# Patient Record
Sex: FEMALE | Race: BLACK OR AFRICAN AMERICAN | NOT HISPANIC OR LATINO | Employment: FULL TIME | URBAN - METROPOLITAN AREA
[De-identification: names, ages, dates, MRNs, and addresses within clinical notes are randomized per-mention and may not be internally consistent; named-entity substitution may affect disease eponyms.]

---

## 2023-03-27 LAB
POC CREATININE: 0.8 MG/DL (ref 0.6–1.3)
POCT GFR - DATA CONVERSION: >60

## 2023-08-21 LAB
ALBUMIN (G/DL) IN SER/PLAS: 4 G/DL (ref 3.4–5)
ANION GAP IN SER/PLAS: 15 MMOL/L (ref 10–20)
CALCIDIOL (25 OH VITAMIN D3) (NG/ML) IN SER/PLAS: 70 NG/ML
CALCIUM (MG/DL) IN SER/PLAS: 10.5 MG/DL (ref 8.6–10.6)
CARBON DIOXIDE, TOTAL (MMOL/L) IN SER/PLAS: 27 MMOL/L (ref 21–32)
CHLORIDE (MMOL/L) IN SER/PLAS: 104 MMOL/L (ref 98–107)
CHOLESTEROL (MG/DL) IN SER/PLAS: 199 MG/DL (ref 0–199)
CHOLESTEROL IN HDL (MG/DL) IN SER/PLAS: 50.3 MG/DL
CHOLESTEROL/HDL RATIO: 4
CREATININE (MG/DL) IN SER/PLAS: 1.07 MG/DL (ref 0.5–1.05)
ESTIMATED AVERAGE GLUCOSE FOR HBA1C: 108 MG/DL
GFR FEMALE: 58 ML/MIN/1.73M2
GLUCOSE (MG/DL) IN SER/PLAS: 82 MG/DL (ref 74–99)
HEMOGLOBIN A1C/HEMOGLOBIN TOTAL IN BLOOD: 5.4 %
LDL: 89 MG/DL (ref 0–99)
NON HDL CHOLESTEROL: 149 MG/DL
PHOSPHATE (MG/DL) IN SER/PLAS: 4.1 MG/DL (ref 2.5–4.9)
POTASSIUM (MMOL/L) IN SER/PLAS: 4.7 MMOL/L (ref 3.5–5.3)
SODIUM (MMOL/L) IN SER/PLAS: 141 MMOL/L (ref 136–145)
TRIGLYCERIDE (MG/DL) IN SER/PLAS: 298 MG/DL (ref 0–149)
UREA NITROGEN (MG/DL) IN SER/PLAS: 13 MG/DL (ref 6–23)
VLDL: 60 MG/DL (ref 0–40)

## 2023-11-10 DIAGNOSIS — I10 ESSENTIAL (PRIMARY) HYPERTENSION: ICD-10-CM

## 2023-11-10 RX ORDER — AMLODIPINE BESYLATE 10 MG/1
10 TABLET ORAL DAILY
Qty: 90 TABLET | Refills: 1 | Status: SHIPPED | OUTPATIENT
Start: 2023-11-10 | End: 2024-05-13

## 2023-11-10 RX ORDER — LOSARTAN POTASSIUM 50 MG/1
50 TABLET ORAL DAILY
Qty: 90 TABLET | Refills: 1 | Status: SHIPPED | OUTPATIENT
Start: 2023-11-10 | End: 2024-05-13

## 2024-05-12 DIAGNOSIS — I10 ESSENTIAL (PRIMARY) HYPERTENSION: ICD-10-CM

## 2024-05-13 RX ORDER — AMLODIPINE BESYLATE 10 MG/1
10 TABLET ORAL DAILY
Qty: 90 TABLET | Refills: 0 | Status: SHIPPED | OUTPATIENT
Start: 2024-05-13

## 2024-05-13 RX ORDER — LOSARTAN POTASSIUM 50 MG/1
50 TABLET ORAL DAILY
Qty: 90 TABLET | Refills: 0 | Status: SHIPPED | OUTPATIENT
Start: 2024-05-13

## 2024-06-26 ENCOUNTER — APPOINTMENT (OUTPATIENT)
Dept: CARDIOLOGY | Facility: HOSPITAL | Age: 64
End: 2024-06-26
Payer: COMMERCIAL

## 2024-06-26 ENCOUNTER — HOSPITAL ENCOUNTER (EMERGENCY)
Facility: HOSPITAL | Age: 64
Discharge: HOME | End: 2024-06-26
Attending: STUDENT IN AN ORGANIZED HEALTH CARE EDUCATION/TRAINING PROGRAM
Payer: COMMERCIAL

## 2024-06-26 VITALS
TEMPERATURE: 97.6 F | RESPIRATION RATE: 18 BRPM | HEIGHT: 61 IN | OXYGEN SATURATION: 100 % | HEART RATE: 82 BPM | BODY MASS INDEX: 26.06 KG/M2 | WEIGHT: 138 LBS | DIASTOLIC BLOOD PRESSURE: 86 MMHG | SYSTOLIC BLOOD PRESSURE: 134 MMHG

## 2024-06-26 DIAGNOSIS — R55 SYNCOPE AND COLLAPSE: Primary | ICD-10-CM

## 2024-06-26 LAB
ALBUMIN SERPL BCP-MCNC: 4.2 G/DL (ref 3.4–5)
ALP SERPL-CCNC: 132 U/L (ref 33–136)
ALT SERPL W P-5'-P-CCNC: 44 U/L (ref 7–45)
ANION GAP SERPL CALC-SCNC: 17 MMOL/L (ref 10–20)
AST SERPL W P-5'-P-CCNC: 46 U/L (ref 9–39)
BASOPHILS # BLD AUTO: 0.02 X10*3/UL (ref 0–0.1)
BASOPHILS NFR BLD AUTO: 0.5 %
BILIRUB SERPL-MCNC: 0.5 MG/DL (ref 0–1.2)
BUN SERPL-MCNC: 9 MG/DL (ref 6–23)
CALCIUM SERPL-MCNC: 10.7 MG/DL (ref 8.6–10.3)
CARDIAC TROPONIN I PNL SERPL HS: 4 NG/L (ref 0–13)
CARDIAC TROPONIN I PNL SERPL HS: 5 NG/L (ref 0–13)
CHLORIDE SERPL-SCNC: 103 MMOL/L (ref 98–107)
CO2 SERPL-SCNC: 24 MMOL/L (ref 21–32)
CREAT SERPL-MCNC: 1.19 MG/DL (ref 0.5–1.05)
EGFRCR SERPLBLD CKD-EPI 2021: 51 ML/MIN/1.73M*2
EOSINOPHIL # BLD AUTO: 0.03 X10*3/UL (ref 0–0.7)
EOSINOPHIL NFR BLD AUTO: 0.8 %
ERYTHROCYTE [DISTWIDTH] IN BLOOD BY AUTOMATED COUNT: 14 % (ref 11.5–14.5)
GLUCOSE SERPL-MCNC: 98 MG/DL (ref 74–99)
HCT VFR BLD AUTO: 42.5 % (ref 36–46)
HGB BLD-MCNC: 14.1 G/DL (ref 12–16)
IMM GRANULOCYTES # BLD AUTO: 0 X10*3/UL (ref 0–0.7)
IMM GRANULOCYTES NFR BLD AUTO: 0 % (ref 0–0.9)
LYMPHOCYTES # BLD AUTO: 1.15 X10*3/UL (ref 1.2–4.8)
LYMPHOCYTES NFR BLD AUTO: 31.4 %
MAGNESIUM SERPL-MCNC: 2.3 MG/DL (ref 1.6–2.4)
MCH RBC QN AUTO: 31.6 PG (ref 26–34)
MCHC RBC AUTO-ENTMCNC: 33.2 G/DL (ref 32–36)
MCV RBC AUTO: 95 FL (ref 80–100)
MONOCYTES # BLD AUTO: 0.19 X10*3/UL (ref 0.1–1)
MONOCYTES NFR BLD AUTO: 5.2 %
NEUTROPHILS # BLD AUTO: 2.27 X10*3/UL (ref 1.2–7.7)
NEUTROPHILS NFR BLD AUTO: 62.1 %
NRBC BLD-RTO: 0.5 /100 WBCS (ref 0–0)
PLATELET # BLD AUTO: 329 X10*3/UL (ref 150–450)
POTASSIUM SERPL-SCNC: 3.5 MMOL/L (ref 3.5–5.3)
PROT SERPL-MCNC: 7.5 G/DL (ref 6.4–8.2)
RBC # BLD AUTO: 4.46 X10*6/UL (ref 4–5.2)
SODIUM SERPL-SCNC: 140 MMOL/L (ref 136–145)
WBC # BLD AUTO: 3.7 X10*3/UL (ref 4.4–11.3)

## 2024-06-26 PROCEDURE — 99283 EMERGENCY DEPT VISIT LOW MDM: CPT

## 2024-06-26 PROCEDURE — 84484 ASSAY OF TROPONIN QUANT: CPT | Performed by: STUDENT IN AN ORGANIZED HEALTH CARE EDUCATION/TRAINING PROGRAM

## 2024-06-26 PROCEDURE — 80053 COMPREHEN METABOLIC PANEL: CPT | Performed by: STUDENT IN AN ORGANIZED HEALTH CARE EDUCATION/TRAINING PROGRAM

## 2024-06-26 PROCEDURE — 36415 COLL VENOUS BLD VENIPUNCTURE: CPT | Performed by: STUDENT IN AN ORGANIZED HEALTH CARE EDUCATION/TRAINING PROGRAM

## 2024-06-26 PROCEDURE — 85025 COMPLETE CBC W/AUTO DIFF WBC: CPT | Performed by: STUDENT IN AN ORGANIZED HEALTH CARE EDUCATION/TRAINING PROGRAM

## 2024-06-26 PROCEDURE — 93005 ELECTROCARDIOGRAM TRACING: CPT

## 2024-06-26 PROCEDURE — 2500000004 HC RX 250 GENERAL PHARMACY W/ HCPCS (ALT 636 FOR OP/ED): Performed by: STUDENT IN AN ORGANIZED HEALTH CARE EDUCATION/TRAINING PROGRAM

## 2024-06-26 PROCEDURE — 83735 ASSAY OF MAGNESIUM: CPT | Performed by: STUDENT IN AN ORGANIZED HEALTH CARE EDUCATION/TRAINING PROGRAM

## 2024-06-26 ASSESSMENT — COLUMBIA-SUICIDE SEVERITY RATING SCALE - C-SSRS
2. HAVE YOU ACTUALLY HAD ANY THOUGHTS OF KILLING YOURSELF?: NO
1. IN THE PAST MONTH, HAVE YOU WISHED YOU WERE DEAD OR WISHED YOU COULD GO TO SLEEP AND NOT WAKE UP?: NO
6. HAVE YOU EVER DONE ANYTHING, STARTED TO DO ANYTHING, OR PREPARED TO DO ANYTHING TO END YOUR LIFE?: NO

## 2024-06-26 ASSESSMENT — PAIN - FUNCTIONAL ASSESSMENT: PAIN_FUNCTIONAL_ASSESSMENT: 0-10

## 2024-06-26 ASSESSMENT — PAIN SCALES - GENERAL
PAINLEVEL_OUTOF10: 0 - NO PAIN
PAINLEVEL_OUTOF10: 0 - NO PAIN

## 2024-06-26 NOTE — DISCHARGE INSTRUCTIONS
You have been seen at a [facility].  Please follow-up with your primary care provider in the next 1 to 2 days for further evaluation and routine follow-up.  Please return to the emergency room if having any worsening symptoms.  Please follow-up with any specialists if discussed during your emergency room stay.

## 2024-06-26 NOTE — ED TRIAGE NOTES
Pt. Presents to the ED from being at a eye doctor appointment with her father when she began to feel light headed. She had a near syncope episode. The clinic called EMS. Upon EMS arrival pt. Was hypotensive 80/60. Pt. Received fluids. Pt. Alert and oriented x4. No complaints at this time.

## 2024-06-26 NOTE — ED PROVIDER NOTES
EMERGENCY MEDICINE EVALUATION NOTE    History of Present Illness     Chief Complaint:   Chief Complaint   Patient presents with    Dizziness       HPI: Ross Chew is a 64 y.o. female with past medical history of CAD and hypertension who presents with complaint of syncope.  Patient is prior to arrival she was at work and was sitting down at her desk when she started to feel severely lightheaded and had an episode of syncope.  She denies any fall or head trauma.  He states she lost consciousness for several seconds and then came to.  She denies any preceding or current chest pain.  Denies any shortness of breath, fever, chills, anticoagulant usage.  She does admit a similar episode years ago.  She states she feels she is back to her baseline and has no current complaints.    Previous History   No past medical history on file.  Past Surgical History:   Procedure Laterality Date    CT ABDOMEN PELVIS ANGIOGRAM W AND/OR WO IV CONTRAST  1/12/2020    CT ABDOMEN PELVIS ANGIOGRAM W AND/OR WO IV CONTRAST 1/12/2020 [facility] CLINICAL LEGACY    CT ABDOMEN PELVIS ANGIOGRAM W AND/OR WO IV CONTRAST  8/25/2020    CT ABDOMEN PELVIS ANGIOGRAM W AND/OR WO IV CONTRAST [place] [facility] [place]        No family history on file.  No Known Allergies  Current Outpatient Medications   Medication Instructions    amLODIPine (NORVASC) 10 mg, oral, Daily, for blood pressure    buPROPion (Zyban) 150 mg 12 hr tablet TAKE 1 TABLET ONCE A DAY FOR 2 DAYS THEN TAKE 1 TABLET TWICE A DAY THEREAFTER.    losartan (COZAAR) 50 mg, oral, Daily       Physical Exam     Appearance: Alert, oriented , cooperative,  in acute distress. Well nourished & well hydrated.     Skin: Intact,  dry skin, no lesions, rash, petechiae or purpura.      Eyes: PERRLA, EOMs intact,  Conjunctiva pink with no redness or exudates. Cornea & anterior chamber are clear, Eyelids without lesions. No scleral icterus.      ENT: Hearing grossly intact. External auditory canals patent,  tympanic membranes intact with visible landmarks. Nares patent, mucus membranes moist. Dentition without lesions. Pharynx clear, uvula midline.      Neck: Supple, without meningismus. Thyroid not palpable. Trachea at midline. No lymphadenopathy.     Pulmonary: Clear bilaterally with good chest wall excursion. No rales, rhonchi or wheezing. No accessory muscle use or stridor.     Cardiac: Normal S1, S2 without murmur, rub, gallop or extrasystole. No JVD, Carotids without bruits.     Abdomen: Soft, nontender, active bowel sounds.  No palpable organomegaly.  No rebound or guarding.  No CVA tenderness.     Genitourinary: Exam deferred.     Musculoskeletal: Full range of motion. no pain, edema, or deformity. Pulses full and equal. No cyanosis or clubbing.      Neurological:  Cranial nerves II through XII are grossly intact, finger-nose touch is normal, normal sensation, no weakness, no focal findings identified.     Psychiatric: Appropriate mood and affect.      Results     Labs Reviewed   CBC WITH AUTO DIFFERENTIAL - Abnormal       Result Value    WBC 3.7 (*)     nRBC 0.5 (*)     RBC 4.46      Hemoglobin 14.1      Hematocrit 42.5      MCV 95      MCH 31.6      MCHC 33.2      RDW 14.0      Platelets 329      Neutrophils % 62.1      Immature Granulocytes %, Automated 0.0      Lymphocytes % 31.4      Monocytes % 5.2      Eosinophils % 0.8      Basophils % 0.5      Neutrophils Absolute 2.27      Immature Granulocytes Absolute, Automated 0.00      Lymphocytes Absolute 1.15 (*)     Monocytes Absolute 0.19      Eosinophils Absolute 0.03      Basophils Absolute 0.02     COMPREHENSIVE METABOLIC PANEL - Abnormal    Glucose 98      Sodium 140      Potassium 3.5      Chloride 103      Bicarbonate 24      Anion Gap 17      Urea Nitrogen 9      Creatinine 1.19 (*)     eGFR 51 (*)     Calcium 10.7 (*)     Albumin 4.2      Alkaline Phosphatase 132      Total Protein 7.5      AST 46 (*)     Bilirubin, Total 0.5      ALT 44     MAGNESIUM  - Normal    Magnesium 2.30     SERIAL TROPONIN-INITIAL - Normal    Troponin I, High Sensitivity 5      Narrative:     Less than 99th percentile of normal range cutoff-  Female and children under 18 years old <14 ng/L; Male <21 ng/L: Negative  Repeat testing should be performed if clinically indicated.     Female and children under 18 years old 14-50 ng/L; Male 21-50 ng/L:  Consistent with possible cardiac damage and possible increased clinical   risk. Serial measurements may help to assess extent of myocardial damage.     >50 ng/L: Consistent with cardiac damage, increased clinical risk and  myocardial infarction. Serial measurements may help assess extent of   myocardial damage.      NOTE: Children less than 1 year old may have higher baseline troponin   levels and results should be interpreted in conjunction with the overall   clinical context.     NOTE: Troponin I testing is performed using a different   testing methodology at [facility] than at other   [facility]. Direct result comparisons should only   be made within the same method.   SERIAL TROPONIN, 1 HOUR - Normal    Troponin I, High Sensitivity 4      Narrative:     Less than 99th percentile of normal range cutoff-  Female and children under 18 years old <14 ng/L; Male <21 ng/L: Negative  Repeat testing should be performed if clinically indicated.     Female and children under 18 years old 14-50 ng/L; Male 21-50 ng/L:  Consistent with possible cardiac damage and possible increased clinical   risk. Serial measurements may help to assess extent of myocardial damage.     >50 ng/L: Consistent with cardiac damage, increased clinical risk and  myocardial infarction. Serial measurements may help assess extent of   myocardial damage.      NOTE: Children less than 1 year old may have higher baseline troponin   levels and results should be interpreted in conjunction with the overall   clinical context.     NOTE: Troponin I testing is performed  "using a different   testing methodology at [facility] than at other   [facility]. Direct result comparisons should only   be made within the same method.   TROPONIN SERIES- (INITIAL, 1 HR)    Narrative:     The following orders were created for panel order Troponin I Series, High Sensitivity (0, 1 HR).  Procedure                               Abnormality         Status                     ---------                               -----------         ------                     Troponin I, High Sensiti...[[id number]]  Normal              Final result               Troponin, High Sensitivi...[[id number]]  Normal              Final result                 Please view results for these tests on the individual orders.     No orders to display         ED Course & Medical Decision Making     Medications   sodium chloride 0.9 % bolus 1,000 mL (0 mL intravenous Stopped 6/26/24 1231)     Diagnoses as of 06/26/24 1420   Syncope and collapse     Heart Rate:  [62-81]   Temperature:  [36.4 °C (97.6 °F)]   Respirations:  [13-30]   BP: (114-133)/(73-81)   Height:  [154.9 cm (5' 1\")]   Weight:  [62.6 kg (138 lb)]   Pulse Ox:  [97 %-100 %]      Ross Chew is a 64 y.o. female with past medical history of CAD and hypertension who presents with complaint of syncope. Patient was nontoxic, stable. Ambulatory. Exam as above.  EKG reviewed and did show normal sinus rhythm with a rate of 64 bpm no significant signs of new acute ischemic change or arrhythmia  Labs reviewed.  No significant anemia, electrolyte abnormality.  Mildly elevated creatinine 1.19 which is appear to be somewhat near baseline most likely chronic.  Nonspecific leukopenia noted.  Reviewed external records.   Differential diagnosis considered. Overall presentation is consistent with syncope. Low suspicion for cardiac dysrhythmia, stroke, intracranial bleed, pulmonary embolus, or other serious cardiac or neurogenic cause of syncope.  Patient was treated with " 1 L normal saline with improvement in symptoms.  Consideration was given for admission, but the patient was stable for outpatient management.  She was provided a cardiology referral for additional evaluation and was agreeable with this plan.      Procedures   Procedures    Diagnosis     1. Syncope and collapse        Disposition     DISCHARGE.  The patient is discharged back to their place of residence.  Discharge diagnosis, instructions and plan were discussed and understood. At the time of discharge the patient was comfortable and was in no apparent distress. Patient is aware of diagnostic uncertainty and was notified though testing is negative here, there is a very small chance that pathology may be missed.  The patient understands these risks and the patient /family understood to return immediately to the emergency department if the symptoms worsen or if they have any additional concerns.    FOLLOW UP  Primary care provider in 1-2 days.        ED Prescriptions    None            Dharmesh Toledo DO  06/26/24 1421

## 2024-06-29 LAB
ATRIAL RATE: 64 BPM
P AXIS: 29 DEGREES
P OFFSET: 180 MS
P ONSET: 131 MS
PR INTERVAL: 180 MS
Q ONSET: 221 MS
QRS COUNT: 11 BEATS
QRS DURATION: 72 MS
QT INTERVAL: 426 MS
QTC CALCULATION(BAZETT): 439 MS
QTC FREDERICIA: 435 MS
R AXIS: 12 DEGREES
T AXIS: 36 DEGREES
T OFFSET: 434 MS
VENTRICULAR RATE: 64 BPM

## 2024-08-12 DIAGNOSIS — I10 ESSENTIAL (PRIMARY) HYPERTENSION: ICD-10-CM

## 2024-08-12 RX ORDER — LOSARTAN POTASSIUM 50 MG/1
50 TABLET ORAL DAILY
Qty: 90 TABLET | Refills: 0 | Status: SHIPPED | OUTPATIENT
Start: 2024-08-12

## 2024-08-12 RX ORDER — AMLODIPINE BESYLATE 10 MG/1
10 TABLET ORAL DAILY
Qty: 90 TABLET | Refills: 0 | Status: SHIPPED | OUTPATIENT
Start: 2024-08-12

## 2024-12-10 ENCOUNTER — APPOINTMENT (OUTPATIENT)
Dept: PRIMARY CARE | Facility: HOSPITAL | Age: 64
End: 2024-12-10
Payer: COMMERCIAL

## 2024-12-11 ENCOUNTER — OFFICE VISIT (OUTPATIENT)
Dept: PRIMARY CARE | Facility: HOSPITAL | Age: 64
End: 2024-12-11
Payer: COMMERCIAL

## 2024-12-11 VITALS
TEMPERATURE: 96.1 F | HEART RATE: 62 BPM | WEIGHT: 139 LBS | HEIGHT: 61 IN | SYSTOLIC BLOOD PRESSURE: 127 MMHG | DIASTOLIC BLOOD PRESSURE: 61 MMHG | OXYGEN SATURATION: 100 % | BODY MASS INDEX: 26.24 KG/M2

## 2024-12-11 DIAGNOSIS — Z12.31 SCREENING MAMMOGRAM FOR BREAST CANCER: ICD-10-CM

## 2024-12-11 DIAGNOSIS — F17.200 SMOKING: ICD-10-CM

## 2024-12-11 DIAGNOSIS — I71.00 INTRAMURAL AORTIC HEMATOMA (MULTI): ICD-10-CM

## 2024-12-11 DIAGNOSIS — E78.5 HYPERLIPIDEMIA, UNSPECIFIED HYPERLIPIDEMIA TYPE: Primary | ICD-10-CM

## 2024-12-11 LAB
ALBUMIN SERPL BCP-MCNC: 4.4 G/DL (ref 3.4–5)
ALP SERPL-CCNC: 100 U/L (ref 33–136)
ALT SERPL W P-5'-P-CCNC: 30 U/L (ref 7–45)
ANION GAP SERPL CALC-SCNC: 16 MMOL/L (ref 10–20)
AST SERPL W P-5'-P-CCNC: 49 U/L (ref 9–39)
BILIRUB SERPL-MCNC: 0.6 MG/DL (ref 0–1.2)
BUN SERPL-MCNC: 14 MG/DL (ref 6–23)
CALCIUM SERPL-MCNC: 10.1 MG/DL (ref 8.6–10.6)
CHLORIDE SERPL-SCNC: 104 MMOL/L (ref 98–107)
CHOLEST SERPL-MCNC: 217 MG/DL (ref 0–199)
CHOLESTEROL/HDL RATIO: 4
CO2 SERPL-SCNC: 24 MMOL/L (ref 21–32)
CREAT SERPL-MCNC: 0.7 MG/DL (ref 0.5–1.05)
EGFRCR SERPLBLD CKD-EPI 2021: >90 ML/MIN/1.73M*2
EST. AVERAGE GLUCOSE BLD GHB EST-MCNC: 111 MG/DL
GLUCOSE SERPL-MCNC: 114 MG/DL (ref 74–99)
HBA1C MFR BLD: 5.5 %
HDLC SERPL-MCNC: 53.8 MG/DL
NON-HDL CHOLESTEROL: 163 MG/DL (ref 0–149)
POTASSIUM SERPL-SCNC: 3.8 MMOL/L (ref 3.5–5.3)
PROT SERPL-MCNC: 7 G/DL (ref 6.4–8.2)
SODIUM SERPL-SCNC: 140 MMOL/L (ref 136–145)

## 2024-12-11 PROCEDURE — 99215 OFFICE O/P EST HI 40 MIN: CPT | Performed by: INTERNAL MEDICINE

## 2024-12-11 PROCEDURE — 80053 COMPREHEN METABOLIC PANEL: CPT

## 2024-12-11 PROCEDURE — 99000 SPECIMEN HANDLING OFFICE-LAB: CPT | Performed by: INTERNAL MEDICINE

## 2024-12-11 PROCEDURE — 83718 ASSAY OF LIPOPROTEIN: CPT

## 2024-12-11 PROCEDURE — 99215 OFFICE O/P EST HI 40 MIN: CPT | Mod: GC

## 2024-12-11 PROCEDURE — 36415 COLL VENOUS BLD VENIPUNCTURE: CPT

## 2024-12-11 PROCEDURE — 83036 HEMOGLOBIN GLYCOSYLATED A1C: CPT

## 2024-12-11 PROCEDURE — 99215 OFFICE O/P EST HI 40 MIN: CPT | Mod: GC,25 | Performed by: INTERNAL MEDICINE

## 2024-12-11 PROCEDURE — 3008F BODY MASS INDEX DOCD: CPT | Performed by: INTERNAL MEDICINE

## 2024-12-11 RX ORDER — BUPROPION HYDROCHLORIDE 150 MG/1
150 TABLET, FILM COATED, EXTENDED RELEASE ORAL 2 TIMES DAILY
Qty: 60 TABLET | Refills: 2 | Status: SHIPPED | OUTPATIENT
Start: 2024-12-11 | End: 2025-03-11

## 2024-12-11 RX ORDER — ATORVASTATIN CALCIUM 40 MG/1
40 TABLET, FILM COATED ORAL DAILY
Qty: 30 TABLET | Refills: 2 | Status: SHIPPED | OUTPATIENT
Start: 2024-12-11 | End: 2025-03-11

## 2024-12-11 ASSESSMENT — PATIENT HEALTH QUESTIONNAIRE - PHQ9
SUM OF ALL RESPONSES TO PHQ9 QUESTIONS 1 AND 2: 0
2. FEELING DOWN, DEPRESSED OR HOPELESS: NOT AT ALL
1. LITTLE INTEREST OR PLEASURE IN DOING THINGS: NOT AT ALL

## 2024-12-11 ASSESSMENT — ENCOUNTER SYMPTOMS
ABDOMINAL PAIN: 0
SHORTNESS OF BREATH: 0
LOSS OF SENSATION IN FEET: 0
DIARRHEA: 0
CONSTIPATION: 0
OCCASIONAL FEELINGS OF UNSTEADINESS: 0
DEPRESSION: 0

## 2024-12-11 ASSESSMENT — PAIN SCALES - GENERAL: PAINLEVEL_OUTOF10: 0-NO PAIN

## 2024-12-11 NOTE — PATIENT INSTRUCTIONS
Dear Ms. Chew,    As discussed today during the visit:    Labs - we collected labs today and will call you with any abnormal results.   No medication changes were made. Please continue taking your medications. We refilled your Bupropion. Kindly let us know if you need any refill in the future  We ordered a screening mammogram.    Please call [phone] to schedule your appointment.     Please come back to see us in: 3 months   ------  If you have any problems or questions, please contact the clinic at [phone] to leave a message. Our fax number is [phone]. If your issue cannot wait until the next business day, please go to urgent care or the emergency department.     I also strongly urge all of my patients to register for [employer] by going to: [email or web address]  (The  staff can also send you a text/email link to register when you check out).    No shows: It is understandable if you are unable to make it to a visit, but please cancel your appointment instead of not showing up. This helps to give other patients access to primary care and keeps wait times low.        [facility]   [phone]

## 2024-12-11 NOTE — PROGRESS NOTES
[facility]      Chief complaint: follow up visit    HPI:  Ross Chew is a 64 y.o. female patient, smoker, with PMHx of HTN, chronic type B aortic intramural hematoma, hyperlipidemia, presenting for follow up.    Reports some mild indigestion and burning like sensation that resolves with tums.    Review of systems:  Review of Systems   Respiratory:  Negative for shortness of breath.    Cardiovascular:  Negative for chest pain.   Gastrointestinal:  Negative for abdominal pain, constipation and diarrhea.        Past medical history:  No past medical history on file.    Surgical history:  Past Surgical History:   Procedure Laterality Date    CT ABDOMEN PELVIS ANGIOGRAM W AND/OR WO IV CONTRAST  1/12/2020    CT ABDOMEN PELVIS ANGIOGRAM W AND/OR WO IV CONTRAST 1/12/2020 [facility] CLINICAL LEGACY    CT ABDOMEN PELVIS ANGIOGRAM W AND/OR WO IV CONTRAST  8/25/2020    CT ABDOMEN PELVIS ANGIOGRAM W AND/OR WO IV CONTRAST [place] [facility]       Family history:  No family history on file.    Medications:  Current Outpatient Medications   Medication Instructions    amLODIPine (NORVASC) 10 mg, oral, Daily, for blood pressure    buPROPion (Zyban) 150 mg 12 hr tablet TAKE 1 TABLET ONCE A DAY FOR 2 DAYS THEN TAKE 1 TABLET TWICE A DAY THEREAFTER.    carvedilol (COREG) 50 mg, oral, 2 times daily (morning and late afternoon)    losartan (COZAAR) 50 mg, oral, Daily   Patient is also on aspirin and statin at home (but does not recall the name of the statin)      Allergies:  No Known Allergies    Health maintenance:  Health Maintenance   Topic Date Due    Yearly Adult Physical  Never done    HIV Screening  Never done    MMR Vaccines (1 of 1 - Standard series) Never done    Hepatitis C Screening  Never done    DTaP/Tdap/Td Vaccines (1 - Tdap) Never done    Zoster Vaccines (1 of 2) Never done    RSV High Risk: (Elderly (60+) or Pregnant Population) (1 - Risk 60-74 years 1-dose series) Never done    Mammogram   02/07/2024    Diabetes Screening  08/21/2024    Influenza Vaccine (1) Never done    COVID-19 Vaccine (3 - 2024-25 season) 09/01/2024    Pneumococcal Vaccine: 65+ Years (1 of 1 - PCV) 05/20/2025    Cervical Cancer Screening  09/07/2025    Lipid Panel  08/21/2028    Colorectal Cancer Screening  10/18/2032    HIB Vaccines  Aged Out    Hepatitis B Vaccines  Aged Out    IPV Vaccines  Aged Out    Hepatitis A Vaccines  Aged Out    Meningococcal Vaccine  Aged Out    Rotavirus Vaccines  Aged Out    HPV Vaccines  Aged Out    Pneumococcal Vaccine: Pediatrics (0 to 5 Years) and At-Risk Patients (6 to 64 Years)  Aged Out    Irritable Bowel Syndrome  Discontinued       Social history:    Tobacco: current smoker, she is on smoking cessation medications, used to smoke 0.5 pack per day for the past 40 years (20 packs year)  Alcohol: occasional  Other drugs: none        Vitals:  There were no vitals filed for this visit.    Vitals:    12/11/24 1444   BP: 127/61   Pulse: 62   Temp: 35.6 °C (96.1 °F)   SpO2: 100%         Physical exam:  Physical Exam  Cardiovascular:      Rate and Rhythm: Normal rate and regular rhythm.   Pulmonary:      Effort: Pulmonary effort is normal.      Breath sounds: Normal breath sounds.   Abdominal:      Palpations: Abdomen is soft.   Musculoskeletal:      Right lower leg: No edema.      Left lower leg: No edema.   Neurological:      Mental Status: She is alert and oriented to person, place, and time.         Labs:  Lab Results   Component Value Date    WBC 3.7 (L) 06/26/2024    HGB 14.1 06/26/2024    HCT 42.5 06/26/2024    MCV 95 06/26/2024     06/26/2024       Lab Results   Component Value Date    GLUCOSE 98 06/26/2024    CALCIUM 10.7 (H) 06/26/2024     06/26/2024    K 3.5 06/26/2024    CO2 24 06/26/2024     06/26/2024    BUN 9 06/26/2024    CREATININE 1.19 (H) 06/26/2024       Lab Results   Component Value Date    HGBA1C 5.4 08/21/2023        Lab Results   Component Value Date    CHOL  "199 08/21/2023    CHOL 195 11/10/2021     Lab Results   Component Value Date    HDL 50.3 08/21/2023    HDL 44.7 11/10/2021     No results found for: \"LDLCALC\"  Lab Results   Component Value Date    TRIG 298 (H) 08/21/2023    TRIG 311 (H) 11/10/2021     No components found for: \"CHOLHDL\"    Imaging:  No results found.    Assessment and plan:  Ross Chew is a 64 y.o. 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 Patient reports she is taking aspirin and statin but does not recall the name of the statin she has at home. Refilled atorvastatin 40.   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     #Hyperlipidemia  -Last lipid profile HDL 50.3, LDL 89,   -on statin    #Elevated creat  -Last creat 1.19 on 6/2024  -Repeat CMP today    #Hx of prediabetes  -Last A1c 5.4 (8/2023), however had an A1c of 5.8 in 2021  -Repeat A1c today    #Smoking cessation  -currently still smoking but reports she reduced the quantity  -on bupropion (refilled)    HEALTH MAINTENANCE     Vaccines:  Influenza: due, refused  COVID: got 2 doses  Tdap: due, refused  Zoster vaccine (adult >51 yo, 2 doses 2-6mo apart): N/A   Pneumococcal vaccine (adult <65 w/ risk factors eg, smoking, diabetes, heart/liver/lung disease; or adult >65: PCV 21, PCV 20, or PCV15 followed by PPSV23): N/A     Cancer screening:  Mammogram (ACOG rec age 40, USPSTF age 50: shared decision making 40-50, then q1-2 yrs until age 74, and then shared decision making): last mammogram feb 2023: BIRADS 2 " (benign). Ordered screening mammogram.  Pap Smear (21-29 every 3 yrs; 30-65  every 5 yrs w/HPV or every 3 years if no HPV): N/A  Colonoscopy (age 45-76 yo): last colonoscopy 10/2022: mild ileitis, 7mm polyp in ascending colon, 5mm polyp in transverse colon, 10mm in sigmoid colon, moderate diverticulosis, external hemorrhoids  Low dose chest CT (age 50-80, 20 yrs smoking hx, current or quit within last 15yrs): qualifies for screening, last CT chest 03/2023: no nodule detected    Other screening tests:  AAA screening: N/A  DEXA scan (females >65 or <65 w/ hx of hip fracture): N/A        Follow-up in 3 mo    Patient and plan discussed with attending physician Dr. Vaughn.    Ana Vines MD

## 2024-12-12 NOTE — PROGRESS NOTES
I saw and evaluated the patient. I personally obtained the key and critical portions of the history and physical exam or was physically present for key and critical portions performed by the resident/fellow. I reviewed the resident/fellow's documentation and discussed the patient with the resident/fellow. I agree with the resident/fellow's medical decision making as documented in the note.    Itz Vaughn MD MPH

## 2024-12-18 ENCOUNTER — TELEPHONE (OUTPATIENT)
Dept: PRIMARY CARE | Facility: HOSPITAL | Age: 64
End: 2024-12-18

## 2024-12-18 DIAGNOSIS — F17.200 SMOKING: ICD-10-CM

## 2024-12-18 RX ORDER — BUPROPION HYDROCHLORIDE 150 MG/1
150 TABLET, FILM COATED, EXTENDED RELEASE ORAL 2 TIMES DAILY
Qty: 60 TABLET | Refills: 2 | Status: SHIPPED | OUTPATIENT
Start: 2024-12-18 | End: 2025-03-18

## 2024-12-18 RX ORDER — ATORVASTATIN CALCIUM 40 MG/1
40 TABLET, FILM COATED ORAL DAILY
Qty: 90 TABLET | Refills: 3 | Status: SHIPPED | OUTPATIENT
Start: 2024-12-18 | End: 2025-12-18

## 2024-12-18 NOTE — PROGRESS NOTES
Rx for atorvastatin and bupropion electronically sent to [facility] on 12/18. It appears original rx on  12/11 was paper rx. Patient updated.

## 2025-01-16 ENCOUNTER — HOSPITAL ENCOUNTER (OUTPATIENT)
Dept: RADIOLOGY | Facility: CLINIC | Age: 65
Discharge: HOME | End: 2025-01-16
Payer: COMMERCIAL

## 2025-01-16 VITALS — HEIGHT: 61 IN | BODY MASS INDEX: 26.22 KG/M2 | WEIGHT: 138.89 LBS

## 2025-01-16 DIAGNOSIS — Z12.31 SCREENING MAMMOGRAM FOR BREAST CANCER: ICD-10-CM

## 2025-01-16 PROCEDURE — 77067 SCR MAMMO BI INCL CAD: CPT

## 2025-02-13 DIAGNOSIS — F17.200 SMOKING: ICD-10-CM

## 2025-02-13 RX ORDER — BUPROPION HYDROCHLORIDE 150 MG/1
150 TABLET, EXTENDED RELEASE ORAL 2 TIMES DAILY
Qty: 180 TABLET | Refills: 1 | Status: SHIPPED | OUTPATIENT
Start: 2025-02-13

## 2025-03-12 ENCOUNTER — OFFICE VISIT (OUTPATIENT)
Dept: PRIMARY CARE | Facility: HOSPITAL | Age: 65
End: 2025-03-12
Payer: COMMERCIAL

## 2025-03-12 VITALS
TEMPERATURE: 97.5 F | DIASTOLIC BLOOD PRESSURE: 79 MMHG | SYSTOLIC BLOOD PRESSURE: 132 MMHG | WEIGHT: 138.1 LBS | HEIGHT: 60 IN | HEART RATE: 65 BPM | OXYGEN SATURATION: 99 % | BODY MASS INDEX: 27.11 KG/M2

## 2025-03-12 DIAGNOSIS — Z72.0 TOBACCO USE: ICD-10-CM

## 2025-03-12 DIAGNOSIS — E78.5 DYSLIPIDEMIA: ICD-10-CM

## 2025-03-12 DIAGNOSIS — I72.9 PSEUDOANEURYSM: Primary | ICD-10-CM

## 2025-03-12 PROCEDURE — 99213 OFFICE O/P EST LOW 20 MIN: CPT | Mod: GC

## 2025-03-12 PROCEDURE — 99213 OFFICE O/P EST LOW 20 MIN: CPT

## 2025-03-12 PROCEDURE — 3008F BODY MASS INDEX DOCD: CPT

## 2025-03-12 ASSESSMENT — PATIENT HEALTH QUESTIONNAIRE - PHQ9
1. LITTLE INTEREST OR PLEASURE IN DOING THINGS: NOT AT ALL
2. FEELING DOWN, DEPRESSED OR HOPELESS: NOT AT ALL
SUM OF ALL RESPONSES TO PHQ9 QUESTIONS 1 AND 2: 0

## 2025-03-12 ASSESSMENT — ENCOUNTER SYMPTOMS
CONSTIPATION: 0
OCCASIONAL FEELINGS OF UNSTEADINESS: 0
SHORTNESS OF BREATH: 0
DEPRESSION: 0
ABDOMINAL PAIN: 0
LOSS OF SENSATION IN FEET: 0
DIARRHEA: 0

## 2025-03-12 ASSESSMENT — PAIN SCALES - GENERAL: PAINLEVEL_OUTOF10: 0-NO PAIN

## 2025-03-12 NOTE — PATIENT INSTRUCTIONS
As discussed today, our plan is:     Labs - we collected labs today and will call you with any abnormal results. If you have any questions or concerns prior to us calling feel free to call our office to have your questions addressed.   We ordered two types of CAT scans, one to screen for cancer given your tobacco use and one to monitor the aneurysm on your artery. We will call you with abnormal results.  Medication changes: NONE  4.   If you smoke or use other tobacco products, take steps to quit. Call [phone] for more information or to set up an appointment with  [facility]. The [place] Tobacco Quit Line is a free resource for people who don’t have insurance, receive Medicaid, pregnant women, or members of the [place] [facility]. Call 1-971-QUIT-NOW or 1-214.269.4561.    Please come back to see us in: 6 months   ------  If you have any problems or questions, please contact the clinic at [phone] to leave a message. Our fax number is [phone]. If your issue cannot wait until the next business day, please go to urgent care or the emergency department.     I also strongly urge all of my patients to register for [employer] by going to: [email or web address]  (The  staff can also send you a text/email link to register when you check out).      [facility]   [phone]

## 2025-03-12 NOTE — PROGRESS NOTES
[facility]      Chief complaint: follow up visit    HPI:  Ross Chew is a 64 y.o. female patient, smoker, with PMHx of HTN, chronic type B aortic intramural hematoma, hyperlipidemia, presenting for follow up.    Patient reports that her indigestion has resolved. Feels well. Denies CP/SOB, N/V, F/C, abdominal pain. Reports that she has been trying to cut back on tobacco but has been having difficulty due to her father's Alzheimers. States she has also been working on exercising. Denies any problems with mood.    Review of systems:  Review of Systems   Respiratory:  Negative for shortness of breath.    Cardiovascular:  Negative for chest pain.   Gastrointestinal:  Negative for abdominal pain, constipation and diarrhea.        Past medical history:  DLD, HTN, pre-diabetes, pseudoaneurysm    Surgical history:  Past Surgical History:   Procedure Laterality Date    CT ABDOMEN PELVIS ANGIOGRAM W AND/OR WO IV CONTRAST  1/12/2020    CT ABDOMEN PELVIS ANGIOGRAM W AND/OR WO IV CONTRAST 1/12/2020 [facility] CLINICAL LEGACY    CT ABDOMEN PELVIS ANGIOGRAM W AND/OR WO IV CONTRAST  8/25/2020    CT ABDOMEN PELVIS ANGIOGRAM W AND/OR WO IV CONTRAST 8/25/2020 [facility] [place]GAC[place]       Family history:  No family history on file.    Medications:  Current Outpatient Medications   Medication Instructions    amLODIPine (NORVASC) 10 mg, oral, Daily, for blood pressure    atorvastatin (LIPITOR) 40 mg, oral, Daily    buPROPion SR (WELLBUTRIN SR) 150 mg, oral, 2 times daily, Do not crush, chew, or split    carvedilol (COREG) 50 mg, oral, 2 times daily (morning and late afternoon)    losartan (COZAAR) 50 mg, oral, Daily   Patient is also on aspirin       Allergies:  No Known Allergies    Health maintenance:  Health Maintenance   Topic Date Due    Influenza Vaccine (1) Never done    Mammogram  01/16/2026    Colorectal Cancer Screening  10/18/2032       Social history:    Tobacco: current smoker, she is on smoking cessation  "medications, used to smoke 0.5 pack per day for the past 40 years (20 packs year)  Alcohol: occasional  Other drugs: none        Vitals:  Vitals:    03/12/25 1423   BP: 132/79   Pulse: 65   Temp: 36.4 °C (97.5 °F)   SpO2: 99%       Vitals:    03/12/25 1423   BP: 132/79   Pulse: 65   Temp: 36.4 °C (97.5 °F)   SpO2: 99%           Physical exam:  Physical Exam  Cardiovascular:      Rate and Rhythm: Normal rate and regular rhythm.   Pulmonary:      Effort: Pulmonary effort is normal.      Breath sounds: Normal breath sounds.   Abdominal:      Palpations: Abdomen is soft.   Musculoskeletal:      Right lower leg: No edema.      Left lower leg: No edema.   Neurological:      Mental Status: She is alert and oriented to person, place, and time.         Labs:  Lab Results   Component Value Date    WBC 3.7 (L) 06/26/2024    HGB 14.1 06/26/2024    HCT 42.5 06/26/2024    MCV 95 06/26/2024     06/26/2024       Lab Results   Component Value Date    GLUCOSE 114 (H) 12/11/2024    CALCIUM 10.1 12/11/2024     12/11/2024    K 3.8 12/11/2024    CO2 24 12/11/2024     12/11/2024    BUN 14 12/11/2024    CREATININE 0.70 12/11/2024       Lab Results   Component Value Date    HGBA1C 5.5 12/11/2024        Lab Results   Component Value Date    CHOL 217 (H) 12/11/2024    CHOL 199 08/21/2023    CHOL 195 11/10/2021     Lab Results   Component Value Date    HDL 53.8 12/11/2024    HDL 50.3 08/21/2023    HDL 44.7 11/10/2021     No results found for: \"LDLCALC\"  Lab Results   Component Value Date    TRIG 298 (H) 08/21/2023    TRIG 311 (H) 11/10/2021     No components found for: \"CHOLHDL\"    Imaging:  No results found.    Assessment and plan:  Ross Chew is a 64 y.o. 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     #Hyperlipidemia  -Last lipid profile HDL 53.8 -> due for lipid panel at this time, ordered  -on statin    #Elevated Creat - resolved  ::Cr 0.70, down from 1.19 6/26  -CTM    #Hx of prediabetes  -Last A1c 5.5 (12/11/2024), however had an A1c of 5.8 in 2021    #Smoking cessation  -currently still smoking but reports she reduced the quantity  -on bupropion   -low dose CT due, ordered    #Health Maintenance  -routine labs: due for lipid panel at this time  -vaccinations: defers at this time, states she will think about it and [facility] if she decides before her next visit  -CT Chest: will be due for CTA of aneurysm+ low dose CT  -colonoscopy: will be due toward end of year for 3 year fu (last performed 2022), defers till the end of the year  -pap/mammogram: last mammogram 1/2025  -DXA: will be due at the end of the year after she turns 65      HEALTH MAINTENANCE     Vaccines:  Influenza: due, refused  COVID: got 2 doses  Tdap: due, refused  Zoster vaccine (adult >51 yo, 2 doses 2-6mo apart): N/A   Pneumococcal vaccine (adult <65 w/ risk factors eg, smoking, diabetes, heart/liver/lung disease; or adult >65: PCV 21, PCV 20, or PCV15 followed by PPSV23): N/A     Cancer screening:  Mammogram (ACOG rec age 40, USPSTF age 50: shared decision making 40-50, then q1-2 yrs until age 74, and then shared decision making): last mammogram jan 2025: BIRADS 2 (benign).   Pap Smear (21-29 every 3 yrs; 30-65  every 5 yrs w/HPV or every 3 years if no HPV): N/A  Colonoscopy (age 45-74 yo): last colonoscopy 10/2022: mild ileitis, 7mm  polyp in ascending colon, 5mm polyp in transverse colon, 10mm in sigmoid colon, moderate diverticulosis, external hemorrhoids -> REPEAT THIS YEAR, WILL BE DUE AT THE END OF THE YEAR  Low dose chest CT (age 50-80, 20 yrs smoking hx, current or quit within last 15yrs): qualifies for screening, last CT chest 03/2023: no nodule detected - due    Other screening tests:  AAA screening: N/A  DEXA scan (females >65 or <65 w/ hx of hip fracture): will be due at the end of the year        Follow-up in 6 mo    Patient and plan discussed with attending physician Dr. Vaughn.    Ivy Rangel MD

## 2025-03-13 LAB
CHOLEST SERPL-MCNC: 166 MG/DL
CHOLEST/HDLC SERPL: 2.9 (CALC)
HDLC SERPL-MCNC: 57 MG/DL
LDLC SERPL CALC-MCNC: 78 MG/DL (CALC)
NONHDLC SERPL-MCNC: 109 MG/DL (CALC)
TRIGL SERPL-MCNC: 215 MG/DL

## 2025-03-27 DIAGNOSIS — I10 ESSENTIAL (PRIMARY) HYPERTENSION: ICD-10-CM

## 2025-03-27 RX ORDER — LOSARTAN POTASSIUM 50 MG/1
50 TABLET ORAL DAILY
Qty: 90 TABLET | Refills: 0 | Status: SHIPPED | OUTPATIENT
Start: 2025-03-27

## 2025-04-02 DIAGNOSIS — I10 HYPERTENSION, UNSPECIFIED TYPE: ICD-10-CM

## 2025-04-02 DIAGNOSIS — N14.11 CONTRAST DYE INDUCED NEPHROPATHY: Primary | ICD-10-CM

## 2025-04-02 DIAGNOSIS — T50.8X5A CONTRAST DYE INDUCED NEPHROPATHY: Primary | ICD-10-CM

## 2025-04-04 LAB
ALBUMIN SERPL-MCNC: 5 G/DL (ref 3.6–5.1)
BUN SERPL-MCNC: 12 MG/DL (ref 7–25)
BUN/CREAT SERPL: ABNORMAL (CALC) (ref 6–22)
CALCIUM SERPL-MCNC: 10.1 MG/DL (ref 8.6–10.4)
CHLORIDE SERPL-SCNC: 104 MMOL/L (ref 98–110)
CO2 SERPL-SCNC: 23 MMOL/L (ref 20–32)
CREAT SERPL-MCNC: 0.81 MG/DL (ref 0.5–1.05)
EGFRCR SERPLBLD CKD-EPI 2021: 81 ML/MIN/1.73M2
GLUCOSE SERPL-MCNC: 101 MG/DL (ref 65–99)
PHOSPHATE SERPL-MCNC: 3.1 MG/DL (ref 2.5–4.5)
POTASSIUM SERPL-SCNC: 3.7 MMOL/L (ref 3.5–5.3)
SODIUM SERPL-SCNC: 143 MMOL/L (ref 135–146)

## 2025-04-07 ENCOUNTER — HOSPITAL ENCOUNTER (OUTPATIENT)
Dept: RADIOLOGY | Facility: HOSPITAL | Age: 65
End: 2025-04-07
Payer: COMMERCIAL

## 2025-04-23 ENCOUNTER — HOSPITAL ENCOUNTER (OUTPATIENT)
Dept: RADIOLOGY | Facility: HOSPITAL | Age: 65
Discharge: HOME | End: 2025-04-23
Payer: COMMERCIAL

## 2025-04-23 DIAGNOSIS — Z72.0 TOBACCO USE: ICD-10-CM

## 2025-04-23 PROCEDURE — 71271 CT THORAX LUNG CANCER SCR C-: CPT | Performed by: STUDENT IN AN ORGANIZED HEALTH CARE EDUCATION/TRAINING PROGRAM

## 2025-04-23 PROCEDURE — 71271 CT THORAX LUNG CANCER SCR C-: CPT

## 2025-04-25 ENCOUNTER — APPOINTMENT (OUTPATIENT)
Dept: RADIOLOGY | Facility: HOSPITAL | Age: 65
End: 2025-04-25
Payer: COMMERCIAL

## 2025-04-25 DIAGNOSIS — I10 ESSENTIAL (PRIMARY) HYPERTENSION: ICD-10-CM

## 2025-04-25 RX ORDER — AMLODIPINE BESYLATE 10 MG/1
10 TABLET ORAL DAILY
Qty: 90 TABLET | Refills: 0 | Status: SHIPPED | OUTPATIENT
Start: 2025-04-25

## 2025-05-07 DIAGNOSIS — Z86.79 HISTORY OF REPAIR OF STANFORD TYPE B DISSECTING ANEURYSM OF THORACIC AORTA: ICD-10-CM

## 2025-05-07 DIAGNOSIS — R79.89 ELEVATED SERUM CREATININE: Primary | ICD-10-CM

## 2025-05-07 DIAGNOSIS — Z98.890 HISTORY OF REPAIR OF STANFORD TYPE B DISSECTING ANEURYSM OF THORACIC AORTA: ICD-10-CM

## 2025-05-07 DIAGNOSIS — Z00.00 ROUTINE GENERAL MEDICAL EXAMINATION AT HEALTH CARE FACILITY: ICD-10-CM

## 2025-05-07 NOTE — PROGRESS NOTES
Placing RFP + Mag prior prior to CT Angio chest with and without to monitor type b chronic aortic intramural hematoma

## 2025-05-27 ENCOUNTER — APPOINTMENT (OUTPATIENT)
Dept: RADIOLOGY | Facility: HOSPITAL | Age: 65
End: 2025-05-27
Payer: MEDICARE

## 2025-06-12 LAB
ALBUMIN SERPL-MCNC: 4.3 G/DL (ref 3.6–5.1)
BUN SERPL-MCNC: 19 MG/DL (ref 7–25)
BUN/CREAT SERPL: 14 (CALC) (ref 6–22)
CALCIUM SERPL-MCNC: 9.9 MG/DL (ref 8.6–10.4)
CHLORIDE SERPL-SCNC: 103 MMOL/L (ref 98–110)
CO2 SERPL-SCNC: 26 MMOL/L (ref 20–32)
CREAT SERPL-MCNC: 1.36 MG/DL (ref 0.5–1.05)
EGFRCR SERPLBLD CKD-EPI 2021: 43 ML/MIN/1.73M2
GLUCOSE SERPL-MCNC: 111 MG/DL (ref 65–99)
MAGNESIUM SERPL-MCNC: 1.8 MG/DL (ref 1.5–2.5)
PHOSPHATE SERPL-MCNC: 3.6 MG/DL (ref 2.1–4.3)
POTASSIUM SERPL-SCNC: 3.7 MMOL/L (ref 3.5–5.3)
SODIUM SERPL-SCNC: 140 MMOL/L (ref 135–146)

## 2025-06-16 ENCOUNTER — HOSPITAL ENCOUNTER (OUTPATIENT)
Dept: RADIOLOGY | Facility: HOSPITAL | Age: 65
Discharge: HOME | End: 2025-06-16
Payer: MEDICARE

## 2025-06-16 DIAGNOSIS — I72.9 PSEUDOANEURYSM: ICD-10-CM

## 2025-06-16 LAB
CREAT SERPL-MCNC: 0.79 MG/DL (ref 0.6–1.3)
GFR SERPL CREATININE-BSD FRML MDRD: 83 ML/MIN/1.73M*2

## 2025-06-16 PROCEDURE — 71275 CT ANGIOGRAPHY CHEST: CPT

## 2025-06-16 PROCEDURE — 82565 ASSAY OF CREATININE: CPT

## 2025-06-16 PROCEDURE — 2550000001 HC RX 255 CONTRASTS

## 2025-06-16 PROCEDURE — 71275 CT ANGIOGRAPHY CHEST: CPT | Performed by: INTERNAL MEDICINE

## 2025-06-16 RX ADMIN — IOHEXOL 75 ML: 350 INJECTION, SOLUTION INTRAVENOUS at 09:11

## 2025-06-20 DIAGNOSIS — I72.9 PSEUDOANEURYSM: Primary | ICD-10-CM

## 2025-06-20 NOTE — PROGRESS NOTES
Placed referral to Vascular Surgery for recommendations on pseudoaneurysm surveillance. 3/2025 CT chest scan demonstrated stable size and anatomy.    Kevan Alaniz MD  PGY-1, Internal Medicine  [facility]

## 2025-06-26 DIAGNOSIS — I10 ESSENTIAL (PRIMARY) HYPERTENSION: ICD-10-CM

## 2025-06-26 RX ORDER — LOSARTAN POTASSIUM 50 MG/1
50 TABLET ORAL DAILY
Qty: 90 TABLET | Refills: 0 | Status: SHIPPED | OUTPATIENT
Start: 2025-06-26

## 2025-07-22 DIAGNOSIS — I10 ESSENTIAL (PRIMARY) HYPERTENSION: ICD-10-CM

## 2025-07-22 RX ORDER — AMLODIPINE BESYLATE 10 MG/1
10 TABLET ORAL DAILY
Qty: 90 TABLET | Refills: 0 | Status: SHIPPED | OUTPATIENT
Start: 2025-07-22

## 2025-07-25 ENCOUNTER — APPOINTMENT (OUTPATIENT)
Dept: PRIMARY CARE | Facility: HOSPITAL | Age: 65
End: 2025-07-25
Payer: MEDICARE

## 2025-07-30 ENCOUNTER — APPOINTMENT (OUTPATIENT)
Dept: PRIMARY CARE | Facility: HOSPITAL | Age: 65
End: 2025-07-30
Payer: MEDICARE

## 2025-07-30 ASSESSMENT — ENCOUNTER SYMPTOMS
CONSTIPATION: 0
DIARRHEA: 0
ABDOMINAL PAIN: 0
SHORTNESS OF BREATH: 0

## 2025-07-30 NOTE — PROGRESS NOTES
[facility]      Chief complaint: follow up visit    HPI:  Ross Chew is a 65 y.o. female patient, smoker, with PMHx of HTN, chronic type B aortic intramural hematoma, hyperlipidemia, presenting for follow up.  Interval history includes surveillance CTA of the chest which demonstrates stable chronic type B aortic intramural hematoma.  Subsequent vascular surgery referral made.  Additionally, low-dose CT the chest displayed only benign 3 mm modules, with recommendations of 1 year repeat    Today the patient reports......    Patient reports that her indigestion has resolved. Feels well. Denies CP/SOB, N/V, F/C, abdominal pain. Reports that she has been trying to cut back on tobacco but has been having difficulty due to her father's Alzheimers. States she has also been working on exercising. Denies any problems with mood.    Review of systems:  Review of Systems   Respiratory:  Negative for shortness of breath.    Cardiovascular:  Negative for chest pain.   Gastrointestinal:  Negative for abdominal pain, constipation and diarrhea.        Past medical history:  DLD, HTN, pre-diabetes, pseudoaneurysm    Surgical history:  Past Surgical History:   Procedure Laterality Date   • CT ABDOMEN PELVIS ANGIOGRAM W AND/OR WO IV CONTRAST  1/12/2020    CT ABDOMEN PELVIS ANGIOGRAM W AND/OR WO IV CONTRAST 1/12/2020 [facility] CLINICAL LEGACY   • CT ABDOMEN PELVIS ANGIOGRAM W AND/OR WO IV CONTRAST  8/25/2020    CT ABDOMEN PELVIS ANGIOGRAM W AND/OR WO IV CONTRAST 8/25/2020 [facility] [place] ANCILLARY LEGACY       Family history:  No family history on file.    Medications:  Current Outpatient Medications   Medication Instructions   • amLODIPine (NORVASC) 10 mg, oral, Daily, for blood pressure   • atorvastatin (LIPITOR) 40 mg, oral, Daily   • buPROPion SR (WELLBUTRIN SR) 150 mg, oral, 2 times daily, Do not crush, chew, or split   • carvedilol (COREG) 50 mg, oral, 2 times daily (morning and late afternoon)   • losartan (COZAAR) 50  mg, oral, Daily   Patient is also on aspirin       Allergies:  No Known Allergies    Health maintenance:  Health Maintenance   Topic Date Due   • Welcome to Medicare Visit  Never done   • MMR Vaccines (1 of 1 - Standard series) Never done   • Hepatitis C Screening  Never done   • Pneumococcal Vaccine (1 of 2 - PCV) Never done   • DTaP/Tdap/Td Vaccines (1 - Tdap) Never done   • Zoster Vaccines (1 of 2) Never done   • RSV High Risk: (Elderly (60+) or Pregnant Population) (1 - Risk 60-74 years 1-dose series) Never done   • COVID-19 Vaccine (3 - 2024-25 season) 09/01/2024   • Bone Density Scan  Never done   • Cervical Cancer Screening  09/07/2025   • Influenza Vaccine (1) 09/01/2025   • Diabetes Screening  12/11/2025   • Mammogram  01/16/2026   • Lipid Panel  03/12/2030   • Colorectal Cancer Screening  10/18/2032   • HIB Vaccines  Aged Out   • Hepatitis B Vaccines  Aged Out   • IPV Vaccines  Aged Out   • Hepatitis A Vaccines  Aged Out   • Meningococcal Vaccine  Aged Out   • Rotavirus Vaccines  Aged Out   • HPV Vaccines  Aged Out   • Irritable Bowel Syndrome  Discontinued       Social history:    Tobacco: current smoker, she is on smoking cessation medications, used to smoke 0.5 pack per day for the past 40 years (20 packs year)  Alcohol: occasional  Other drugs: none        Vitals:  There were no vitals filed for this visit.      There were no vitals filed for this visit.          Physical exam:  Physical Exam    Cardiovascular:      Rate and Rhythm: Normal rate and regular rhythm.   Pulmonary:      Effort: Pulmonary effort is normal.      Breath sounds: Normal breath sounds.   Abdominal:      Palpations: Abdomen is soft.     Musculoskeletal:      Right lower leg: No edema.      Left lower leg: No edema.     Neurological:      Mental Status: She is alert and oriented to person, place, and time.         Labs:  Lab Results   Component Value Date    WBC 3.7 (L) 06/26/2024    HGB 14.1 06/26/2024    HCT 42.5 06/26/2024     "MCV 95 06/26/2024     06/26/2024       Lab Results   Component Value Date    GLUCOSE 111 (H) 06/11/2025    CALCIUM 9.9 06/11/2025     06/11/2025    K 3.7 06/11/2025    CO2 26 06/11/2025     06/11/2025    BUN 19 06/11/2025    CREATININE 1.36 (H) 06/11/2025       Lab Results   Component Value Date    HGBA1C 5.5 12/11/2024        Lab Results   Component Value Date    CHOL 166 03/12/2025    CHOL 217 (H) 12/11/2024    CHOL 199 08/21/2023     Lab Results   Component Value Date    HDL 57 03/12/2025    HDL 53.8 12/11/2024    HDL 50.3 08/21/2023     Lab Results   Component Value Date    LDLCALC 78 03/12/2025     Lab Results   Component Value Date    TRIG 215 (H) 03/12/2025    TRIG 298 (H) 08/21/2023    TRIG 311 (H) 11/10/2021     No components found for: \"CHOLHDL\"    Imaging:  No results found.    Assessment and plan:  Ross Chew is a 65 y.o. 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     #Hyperlipidemia  :: Lipid panel 3/2025: Total cholesterol 166, LDL 78, HDL 57, triglycerides 215  -on statin    #Elevated Creat - resolved  ::Cr 0.70, down from 1.19 6/26  -CTM    #Hx of prediabetes  -Last A1c 5.5 (12/11/2024), however had an A1c of 5.8 in 2021    #Smoking cessation  -currently still smoking but reports she reduced the quantity  -on bupropion   -low dose CT due 2025 appropriate, due in 2026 " spring    #Health Maintenance  -routine labs: due for lipid panel at this time  -vaccinations: defers at this time, states she will think about it [facility] decides before her next visit  -CT Chest: will be due for CTA of aneurysm+ low dose CT  -colonoscopy: will be due toward end of year for 3 year fu (last performed 2022), defers till the end of the year  -pap/mammogram: last mammogram 1/2025  -DXA: will be due at the end of the year after she turns 65      HEALTH MAINTENANCE     Vaccines:  Influenza: due, refused  COVID: got 2 doses  Tdap: due, refused  Zoster vaccine (adult >51 yo, 2 doses 2-6mo apart): N/A   Pneumococcal vaccine (adult <65 w/ risk factors eg, smoking, diabetes, heart/liver/lung disease; or adult >65: PCV 21, PCV 20, or PCV15 followed by PPSV23): N/A     Cancer screening:  Mammogram (ACOG rec age 40, USPSTF age 50: shared decision making 40-50, then q1-2 yrs until age 74, and then shared decision making): last mammogram jan 2025: BIRADS 2 (benign).   Pap Smear (21-29 every 3 yrs; 30-65  every 5 yrs w/HPV or every 3 years if no HPV): N/A  Colonoscopy (age 45-74 yo): last colonoscopy 10/2022: mild ileitis, 7mm polyp in ascending colon, 5mm polyp in transverse colon, 10mm in sigmoid colon, moderate diverticulosis, external hemorrhoids -> REPEAT THIS YEAR, WILL BE DUE AT THE END OF THE YEAR  Low dose chest CT (age 50-80, 20 yrs smoking hx, current or quit within last 15yrs): qualifies for screening, last CT chest 03/2023: no nodule detected - due    Other screening tests:  AAA screening: N/A  DEXA scan (females >65 or <65 w/ hx of hip fracture): will be due at the end of the year        Follow-up in 6 mo    Patient and plan discussed with attending physician Dr. Vaughn.    Celestine Rainey DO

## 2025-08-06 ENCOUNTER — OFFICE VISIT (OUTPATIENT)
Dept: PRIMARY CARE | Facility: HOSPITAL | Age: 65
End: 2025-08-06
Payer: MEDICARE

## 2025-08-06 VITALS
BODY MASS INDEX: 27.09 KG/M2 | DIASTOLIC BLOOD PRESSURE: 79 MMHG | OXYGEN SATURATION: 99 % | SYSTOLIC BLOOD PRESSURE: 120 MMHG | WEIGHT: 138 LBS | HEIGHT: 60 IN | TEMPERATURE: 96.3 F | HEART RATE: 64 BPM

## 2025-08-06 DIAGNOSIS — Z11.59 ENCOUNTER FOR HEPATITIS C SCREENING TEST FOR LOW RISK PATIENT: ICD-10-CM

## 2025-08-06 DIAGNOSIS — Z13.820 ENCOUNTER FOR SCREENING FOR OSTEOPOROSIS: ICD-10-CM

## 2025-08-06 DIAGNOSIS — Z71.6 ENCOUNTER FOR SMOKING CESSATION COUNSELING: ICD-10-CM

## 2025-08-06 DIAGNOSIS — N17.9 AKI (ACUTE KIDNEY INJURY): Primary | ICD-10-CM

## 2025-08-06 DIAGNOSIS — N17.1 ACUTE KIDNEY FAILURE WITH ACUTE CORTICAL NECROSIS: ICD-10-CM

## 2025-08-06 DIAGNOSIS — E78.5 DYSLIPIDEMIA: ICD-10-CM

## 2025-08-06 DIAGNOSIS — M81.8 OTHER OSTEOPOROSIS WITHOUT CURRENT PATHOLOGICAL FRACTURE: ICD-10-CM

## 2025-08-06 PROCEDURE — 99212 OFFICE O/P EST SF 10 MIN: CPT

## 2025-08-06 RX ORDER — ATORVASTATIN CALCIUM 40 MG/1
40 TABLET, FILM COATED ORAL DAILY
Qty: 90 TABLET | Refills: 3 | Status: SHIPPED | OUTPATIENT
Start: 2025-08-06 | End: 2026-08-06

## 2025-08-06 RX ORDER — ASPIRIN 81 MG/1
81 TABLET ORAL DAILY
COMMUNITY

## 2025-08-06 RX ORDER — CHOLECALCIFEROL (VITAMIN D3) 25 MCG
25 TABLET ORAL DAILY
COMMUNITY

## 2025-08-06 RX ORDER — VARENICLINE TARTRATE 1 MG/1
TABLET, FILM COATED ORAL
Qty: 60 TABLET | Refills: 2 | Status: SHIPPED | OUTPATIENT
Start: 2025-08-06 | End: 2025-08-06

## 2025-08-06 RX ORDER — VARENICLINE TARTRATE 1 MG/1
TABLET, FILM COATED ORAL
Qty: 60 TABLET | Refills: 3 | Status: SHIPPED | OUTPATIENT
Start: 2025-08-06 | End: 2025-11-11

## 2025-08-06 ASSESSMENT — PATIENT HEALTH QUESTIONNAIRE - PHQ9
2. FEELING DOWN, DEPRESSED OR HOPELESS: NOT AT ALL
1. LITTLE INTEREST OR PLEASURE IN DOING THINGS: NOT AT ALL
SUM OF ALL RESPONSES TO PHQ9 QUESTIONS 1 AND 2: 0

## 2025-08-06 ASSESSMENT — ENCOUNTER SYMPTOMS
OCCASIONAL FEELINGS OF UNSTEADINESS: 0
LOSS OF SENSATION IN FEET: 0
DEPRESSION: 0

## 2025-08-06 ASSESSMENT — PAIN SCALES - GENERAL: PAINLEVEL_OUTOF10: 0-NO PAIN

## 2025-08-06 NOTE — PROGRESS NOTES
[facility]    Chief complaint: follow up visit    HPI:  Ross Chew is a 65 y.o. female patient, smoker, with PMHx of HTN, chronic type B aortic intramural hematoma, hyperlipidemia, presenting for follow up.    Interval Hx:     Last seen 3/2025 by Dr. Brooks. At the time doing well, was attempting to cut back on smoking, inc exercise, noted some difficulty d/t fathers alzheimer's. LDCT ordered (couple of small noncalcified pulmonary nodules measuring up to 3  Mm, repeat in a year) and CTA ordered for monitoring of aneurysm which was unchanged compared to prior, no med changes made.     Per pt:    She notes since her father has passed in May but has been handling it well, she reports she is using the free time to take care of herself, traveling with [email or web address] to [facility] soon. Currently on about 8 cigarettes a day from half a pack. Interested in further meds to help with quitting. Apart from that continues to take meds as prescribed, no complaints.     Past medical history:  DLD, HTN, pre-diabetes, pseudoaneurysm    Surgical history:  Past Surgical History:   Procedure Laterality Date    CT ABDOMEN PELVIS ANGIOGRAM W AND/OR WO IV CONTRAST  1/12/2020    CT ABDOMEN PELVIS ANGIOGRAM W AND/OR WO IV CONTRAST 1/12/2020 [facility] CLINICAL LEGACY    CT ABDOMEN PELVIS ANGIOGRAM W AND/OR WO IV CONTRAST  8/25/2020    CT ABDOMEN PELVIS ANGIOGRAM W AND/OR WO IV CONTRAST [place] [facility] [place]       Family history:  No family history on file.    Medications:  Current Outpatient Medications   Medication Instructions    amLODIPine (NORVASC) 10 mg, oral, Daily, for blood pressure    aspirin 81 mg, Daily    atorvastatin (LIPITOR) 40 mg, oral, Daily    buPROPion SR (WELLBUTRIN SR) 150 mg, oral, 2 times daily, Do not crush, chew, or split    carvedilol (COREG) 50 mg, oral, 2 times daily (morning and late afternoon)    cholecalciferol (VITAMIN D3) 25 mcg, Daily    losartan (COZAAR) 50 mg, oral, Daily     varenicline tartrate (Chantix) 1 mg tablet Take 0.5 tablets (0.5 mg) by mouth once daily for 3 days, THEN 0.5 tablets (0.5 mg) 2 times a day for 4 days, THEN 1 tablet (1 mg) 2 times a day. Take with full glass of water.         Allergies:  No Known Allergies    Health maintenance:  Health Maintenance   Topic Date Due    Welcome to Medicare Visit  Never done    MMR Vaccines (1 of 1 - Standard series) Never done    Hepatitis C Screening  Never done    Pneumococcal Vaccine (1 of 2 - PCV) Never done    DTaP/Tdap/Td Vaccines (1 - Tdap) Never done    Zoster Vaccines (1 of 2) Never done    RSV High Risk: (Elderly (60+) or Pregnant Population) (1 - Risk 60-74 years 1-dose series) Never done    COVID-19 Vaccine (3 - 2024-25 season) 09/01/2024    Bone Density Scan  Never done    Influenza Vaccine (1) 09/01/2025    Cervical Cancer Screening  09/07/2025    Diabetes Screening  12/11/2025    Mammogram  01/16/2026    Lipid Panel  03/12/2030    Colorectal Cancer Screening  10/18/2032    HIB Vaccines  Aged Out    Hepatitis B Vaccines  Aged Out    IPV Vaccines  Aged Out    Hepatitis A Vaccines  Aged Out    Meningococcal Vaccine  Aged Out    Rotavirus Vaccines  Aged Out    HPV Vaccines  Aged Out    Irritable Bowel Syndrome  Discontinued       Social history:    Tobacco: current smoker, she is on smoking cessation medications, used to smoke 0.5 pack per day for the past 40 years (20 packs year)  Alcohol: occasional  Other drugs: none        Vitals:  Vitals:    08/06/25 1256   BP: 120/79   Pulse: 64   Temp: 35.7 °C (96.3 °F)   SpO2: 99%       Vitals:    08/06/25 1256   BP: 120/79   Pulse: 64   Temp: 35.7 °C (96.3 °F)   SpO2: 99%           Physical exam:  Constitutional: Well-developed female in no acute distress, appears stated age  HEENT: NC/AT, sclera anicteric  Respiratory: CTAB. No wheezes, rales, or rhonchi. Normal respiratory effort.  Cardiovascular: RRR, No murmurs, gallops, or rubs  Abdominal: Soft, nontender, nondistended.  "Bowel sounds present  Neuro: AAOx3 , CN II-XII grossly intact  MSK: No LE edema bilaterally, moving extremities well  Skin: Warm, dry. Well perfused  Psych: Appropriate mood and affect     Labs:  Lab Results   Component Value Date    WBC 3.7 (L) 06/26/2024    HGB 14.1 06/26/2024    HCT 42.5 06/26/2024    MCV 95 06/26/2024     06/26/2024       Lab Results   Component Value Date    GLUCOSE 111 (H) 06/11/2025    CALCIUM 9.9 06/11/2025     06/11/2025    K 3.7 06/11/2025    CO2 26 06/11/2025     06/11/2025    BUN 19 06/11/2025    CREATININE 1.36 (H) 06/11/2025       Lab Results   Component Value Date    HGBA1C 5.5 12/11/2024        Lab Results   Component Value Date    CHOL 166 03/12/2025    CHOL 217 (H) 12/11/2024    CHOL 199 08/21/2023     Lab Results   Component Value Date    HDL 57 03/12/2025    HDL 53.8 12/11/2024    HDL 50.3 08/21/2023     Lab Results   Component Value Date    LDLCALC 78 03/12/2025     Lab Results   Component Value Date    TRIG 215 (H) 03/12/2025    TRIG 298 (H) 08/21/2023    TRIG 311 (H) 11/10/2021     No components found for: \"CHOLHDL\"    Imaging:  No results found.    Assessment and plan:  Ross Chew is a 65 y.o. female patient with PMHx of HTN, hyperlipidemia, chronic intramural aortic hematoma, presenting for follow up. Currently doing well. She is agreeable to trying chantix for smoking cessation. Counseled on possible side effects.     UPDATES:  -rfp, urine studies to assess PACO  -vareniciline  -rec to obtain RSV, pnuemococcal vaccines at pharmacy  -DEXA ordered    #Smoking cessation  ::LDCT 4/2025: couple of small noncalcified pulmonary nodules measuring up to 3  mm, likely benign, repeat 1 year  -currently still smoking but reports she reduced the quantity  -on bupropion   -start varenicline is 0.5 mg once daily for three days, then 0.5 mg twice daily for four days, and then 1 mg twice daily for the remainder of a 12-week course    #PACO  ::Cr 0.70, down from 1.19 " 6/26/24, latest 1.36 in June  -etiology unclear, denies recent urinary sx or changes  -will order repeat rfp. 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    #Hyperlipidemia  -on statin    #Hx of prediabetes  -Last A1c 5.5 (12/11/2024), however had an A1c of 5.8 in 2021    #Health Maintenance    HEALTH MAINTENANCE     Vaccines:  Influenza: n/a  COVID: got 2 doses  Tdap: due, refused  Zoster vaccine (adult >51 yo, 2 doses 2-6mo apart): N/A   Pneumococcal vaccine (adult <65 w/ risk factors eg, smoking, diabetes, heart/liver/lung disease; or adult >65: PCV 21, PCV 20, or PCV15 followed by PPSV23): > she will complete at pharmacy    Cancer screening:  Mammogram (ACOG rec age 40, USPSTF age 50: shared decision making 40-50, then q1-2 yrs until age 74, and then shared decision making): last mammogram jan 2025: BIRADS 2 (benign). Due 1/2026  Pap Smear (21-29 every 3 yrs; 30-65  every 5 yrs w/HPV or every 3 years if no HPV): N/A  Colonoscopy (age 45-74 yo): last colonoscopy 10/2022: mild ileitis, 7mm polyp in ascending colon, 5mm polyp in transverse colon, 10mm in sigmoid colon, moderate diverticulosis, external hemorrhoids -> REPEAT THIS YEAR, WILL BE DUE AT THE END OF THE YEAR  Low dose chest CT (age 50-80, 20 yrs smoking hx, current or quit within last 15yrs): qualifies for screening, due 04/2026    Other screening tests:  AAA screening: N/A  DEXA scan (females >65 or <65 w/ hx of hip fracture): ordered        Follow-up in 3-4 mo    Patient and plan discussed with attending  physician Dr. Vaguhn.    Shirley Cardoso MD

## 2025-08-06 NOTE — PATIENT INSTRUCTIONS
As discussed today, our plan is:     For smoking, we will be prescribing you varenicline, please take 0.5 mg once daily for three days, then 0.5 mg twice daily for four days, and then 1 mg twice daily for the remainder of a 12-week course. This dose up-titration helps to minimize gastrointestinal side effects, especially nausea.   Labs - we ordered labs today to monitor your kidney function and will call you with any abnormal results. If you have any questions or concerns prior to us calling feel free to call our office to have your questions addressed.   Please obtain your PPSV23 vaccine and RSV vaccine at your local pharmacy, this is to continue to ensure good health  Referred you for a DEXA bone scan to ensure good bone health, you can get this done on the 5th floor of this building ([facility])  5.   If you smoke or use other tobacco products, take steps to quit. Call [phone] for more information or to set up an appointment with  [facility]. The [place] [facility] Quit Line is a free resource for people who don’t have insurance, receive Medicaid, pregnant women, or members of the [place] [facility]. Call 7-237-QUIT-NOW or 1-608.490.3219.    Please come back to see ME between 11/19-12/2  ------  If you have any problems or questions, please contact the clinic at [phone] to leave a message. Our fax number is [phone]. If your issue cannot wait until the next business day, please go to urgent care or the emergency department.     I also strongly urge all of my patients to register for [facility] by going to: [email or web address]  (The  staff can also send you a text/email link to register when you check out).    No shows: It is understandable if you are unable to make it to a visit, but please cancel your appointment instead of not showing up. This helps to give other patients access to primary care and keeps wait times low.        Adria Ching  [facility]   [phone]

## 2025-08-07 LAB
ALBUMIN SERPL-MCNC: 4.7 G/DL (ref 3.6–5.1)
BUN SERPL-MCNC: 16 MG/DL (ref 7–25)
BUN/CREAT SERPL: 15 (CALC) (ref 6–22)
CALCIUM SERPL-MCNC: 10.2 MG/DL (ref 8.6–10.4)
CHLORIDE SERPL-SCNC: 104 MMOL/L (ref 98–110)
CO2 SERPL-SCNC: 29 MMOL/L (ref 20–32)
CREAT SERPL-MCNC: 1.08 MG/DL (ref 0.5–1.05)
EGFRCR SERPLBLD CKD-EPI 2021: 57 ML/MIN/1.73M2
GLUCOSE SERPL-MCNC: 85 MG/DL (ref 65–99)
HCV AB SERPL QL IA: NORMAL
PHOSPHATE SERPL-MCNC: 4 MG/DL (ref 2.1–4.3)
POTASSIUM SERPL-SCNC: 4.8 MMOL/L (ref 3.5–5.3)
SODIUM SERPL-SCNC: 140 MMOL/L (ref 135–146)

## 2025-08-16 DIAGNOSIS — F17.200 NICOTINE DEPENDENCE, UNSPECIFIED, UNCOMPLICATED: ICD-10-CM

## 2025-08-18 RX ORDER — BUPROPION HYDROCHLORIDE 150 MG/1
150 TABLET, EXTENDED RELEASE ORAL 2 TIMES DAILY
Qty: 180 TABLET | Refills: 1 | Status: SHIPPED | OUTPATIENT
Start: 2025-08-18